# Patient Record
Sex: FEMALE | Race: WHITE | Employment: UNEMPLOYED | URBAN - METROPOLITAN AREA
[De-identification: names, ages, dates, MRNs, and addresses within clinical notes are randomized per-mention and may not be internally consistent; named-entity substitution may affect disease eponyms.]

---

## 2017-08-14 PROBLEM — D48.5 NEOPLASM OF UNCERTAIN BEHAVIOR OF SKIN: Status: ACTIVE | Noted: 2017-08-14

## 2017-08-14 PROBLEM — R52 SCAR PAINFUL: Status: ACTIVE | Noted: 2017-08-14

## 2017-08-14 PROBLEM — L90.5 SCAR PAINFUL: Status: ACTIVE | Noted: 2017-08-14

## 2017-12-18 ENCOUNTER — APPOINTMENT (OUTPATIENT)
Dept: GENERAL RADIOLOGY | Age: 52
End: 2017-12-18
Payer: MEDICARE

## 2017-12-18 ENCOUNTER — HOSPITAL ENCOUNTER (EMERGENCY)
Age: 52
Discharge: HOME | End: 2017-12-18
Attending: EMERGENCY MEDICINE
Payer: MEDICARE

## 2017-12-18 VITALS
DIASTOLIC BLOOD PRESSURE: 83 MMHG | TEMPERATURE: 97.8 F | SYSTOLIC BLOOD PRESSURE: 131 MMHG | HEART RATE: 75 BPM | BODY MASS INDEX: 27.09 KG/M2 | HEIGHT: 72 IN | WEIGHT: 200 LBS | OXYGEN SATURATION: 98 % | RESPIRATION RATE: 18 BRPM

## 2017-12-18 DIAGNOSIS — M79.671 RIGHT FOOT PAIN: Primary | ICD-10-CM

## 2017-12-18 PROCEDURE — 73630 X-RAY EXAM OF FOOT: CPT

## 2017-12-18 PROCEDURE — 99283 EMERGENCY DEPT VISIT LOW MDM: CPT

## 2017-12-18 RX ORDER — NAPROXEN 500 MG/1
500 TABLET ORAL 2 TIMES DAILY
Qty: 20 TABLET | Refills: 0 | Status: SHIPPED | OUTPATIENT
Start: 2017-12-18

## 2017-12-18 ASSESSMENT — PAIN DESCRIPTION - ORIENTATION: ORIENTATION: RIGHT

## 2017-12-18 ASSESSMENT — PAIN SCALES - GENERAL: PAINLEVEL_OUTOF10: 8

## 2017-12-18 ASSESSMENT — PAIN DESCRIPTION - DESCRIPTORS: DESCRIPTORS: ACHING;DISCOMFORT;THROBBING

## 2017-12-18 ASSESSMENT — PAIN DESCRIPTION - FREQUENCY: FREQUENCY: CONTINUOUS

## 2017-12-18 ASSESSMENT — ENCOUNTER SYMPTOMS: COLOR CHANGE: 0

## 2017-12-18 ASSESSMENT — PAIN DESCRIPTION - LOCATION: LOCATION: FOOT

## 2017-12-18 NOTE — ED PROVIDER NOTES
mouth 2 times daily, Disp-20 tablet, R-0Print                 (Please note that portions of this note were completed with a voice recognition program.  Efforts were made to edit the dictations but occasionally words are mis-transcribed.)    FRANCHESKA Richter NP  12/18/17 0967

## 2017-12-20 ENCOUNTER — OFFICE VISIT (OUTPATIENT)
Dept: PODIATRY | Age: 52
End: 2017-12-20
Payer: MEDICARE

## 2017-12-20 VITALS — BODY MASS INDEX: 27.09 KG/M2 | WEIGHT: 200 LBS | HEIGHT: 72 IN

## 2017-12-20 DIAGNOSIS — M79.671 PAIN IN BOTH FEET: ICD-10-CM

## 2017-12-20 DIAGNOSIS — Q66.229 METATARSUS ADDUCTUS: ICD-10-CM

## 2017-12-20 DIAGNOSIS — M79.672 PAIN IN BOTH FEET: ICD-10-CM

## 2017-12-20 DIAGNOSIS — M72.2 PLANTAR FASCIAL FIBROMATOSIS: Primary | ICD-10-CM

## 2017-12-20 PROCEDURE — G8484 FLU IMMUNIZE NO ADMIN: HCPCS | Performed by: PODIATRIST

## 2017-12-20 PROCEDURE — L3020 FOOT LONGITUD/METATARSAL SUP: HCPCS | Performed by: PODIATRIST

## 2017-12-20 PROCEDURE — G8427 DOCREV CUR MEDS BY ELIG CLIN: HCPCS | Performed by: PODIATRIST

## 2017-12-20 PROCEDURE — 99203 OFFICE O/P NEW LOW 30 MIN: CPT | Performed by: PODIATRIST

## 2017-12-20 PROCEDURE — G8419 CALC BMI OUT NRM PARAM NOF/U: HCPCS | Performed by: PODIATRIST

## 2017-12-20 PROCEDURE — 3014F SCREEN MAMMO DOC REV: CPT | Performed by: PODIATRIST

## 2017-12-20 PROCEDURE — 4004F PT TOBACCO SCREEN RCVD TLK: CPT | Performed by: PODIATRIST

## 2017-12-20 PROCEDURE — 29540 STRAPPING ANKLE &/FOOT: CPT | Performed by: PODIATRIST

## 2017-12-20 PROCEDURE — 3017F COLORECTAL CA SCREEN DOC REV: CPT | Performed by: PODIATRIST

## 2017-12-20 NOTE — PROGRESS NOTES
Review of Systems    Lower Extremity Physical Examination:     Vitals: There were no vitals filed for this visit. General: AAO x 3 in NAD. Dermatologic Exam:  Skin lesion/ulceration Absent . Skin No rashes or nodules noted. .       Musculoskeletal:     1st MPJ ROM decreased, Bilateral.  Muscle strength 5/5, Bilateral.  Pain present upon palpation of right and left plantar medial calcaneal tuberosity  Pain increased with Dorsiflexion of the toes neto. .  Medial longitudinal arch, Bilateral WNL. Ankle ROM WNL,Bilateral.    Dorsally contracted digits absent digits 1-5 Bilateral.     Vascular: DP and PT pulses palpable 2/4, Bilateral.  CFT <3 seconds, Bilateral.  Hair growth present to the level of the digits, Bilateral.  Edema absent, Bilateral.  Varicosities absent, Bilateral. Erythema absent, Bilateral    Neurological: Sensation intact to light touch to level of digits, Bilateral.  Protective sensation intact 10/10 sites via 5.07/10g Pollock-Daquan Monofilament, Bilateral.  negative Tinel's, Bilateral.  negative Valleix sign, Bilateral.      Integument: Warm, dry, supple, Bilateral.  Open lesion absent, Bilateral.  Interdigital maceration absent to web spaces 1-4, Bilateral.  Nails are normal in length, thickness and color 1-5 bilateral.  Fissures absent, Bilateral.     Radiographs:  Previously done at [facility] on 12/18/17. Reviewed with the patient  Asessment: Patient is a 47 y.o. female with:   1. Plantar fascial fibromatosis  GA FOOT LONGITUD/METATARSAL SUP    78105 - GA STRAPPING; ANKLE &/OR FOOT   2. Pain in both feet  GA FOOT LONGITUD/METATARSAL SUP    68251 - GA STRAPPING; ANKLE &/OR FOOT   3. Metatarsus adductus  GA FOOT LONGITUD/METATARSAL SUP    36826 - GA STRAPPING; ANKLE &/OR FOOT       Plan: Patient examined and evaluated. Current condition and treatment options discussed in detail. Discussed conservative and surgical options with the patient.  Pt was placed in a low dye taping to both PM  12/20/2017

## 2018-08-08 ENCOUNTER — HOSPITAL ENCOUNTER (EMERGENCY)
Age: 53
Discharge: HOME | End: 2018-08-08
Payer: MEDICARE

## 2018-08-08 ENCOUNTER — APPOINTMENT (OUTPATIENT)
Dept: GENERAL RADIOLOGY | Age: 53
End: 2018-08-08
Payer: MEDICARE

## 2018-08-08 VITALS
RESPIRATION RATE: 18 BRPM | HEART RATE: 66 BPM | OXYGEN SATURATION: 98 % | DIASTOLIC BLOOD PRESSURE: 86 MMHG | SYSTOLIC BLOOD PRESSURE: 141 MMHG | TEMPERATURE: 98.2 F | WEIGHT: 205.3 LBS | BODY MASS INDEX: 27.81 KG/M2 | HEIGHT: 72 IN

## 2018-08-08 DIAGNOSIS — F41.0 PANIC ATTACK: ICD-10-CM

## 2018-08-08 DIAGNOSIS — R03.0 ELEVATED BLOOD PRESSURE READING: ICD-10-CM

## 2018-08-08 DIAGNOSIS — J01.90 ACUTE SINUSITIS, RECURRENCE NOT SPECIFIED, UNSPECIFIED LOCATION: Primary | ICD-10-CM

## 2018-08-08 LAB
ABSOLUTE EOS #: 0.3 K/UL (ref 0–0.4)
ABSOLUTE IMMATURE GRANULOCYTE: NORMAL K/UL (ref 0–0.3)
ABSOLUTE LYMPH #: 2.3 K/UL (ref 1–4.8)
ABSOLUTE MONO #: 0.5 K/UL (ref 0.2–0.8)
ALBUMIN SERPL-MCNC: 4.2 G/DL (ref 3.5–5.2)
ALBUMIN/GLOBULIN RATIO: ABNORMAL (ref 1–2.5)
ALP BLD-CCNC: 57 U/L (ref 35–104)
ALT SERPL-CCNC: 18 U/L (ref 5–33)
ANION GAP SERPL CALCULATED.3IONS-SCNC: 10 MMOL/L (ref 9–17)
AST SERPL-CCNC: 16 U/L
BASOPHILS # BLD: 0 % (ref 0–2)
BASOPHILS ABSOLUTE: 0 K/UL (ref 0–0.2)
BILIRUB SERPL-MCNC: 0.24 MG/DL (ref 0.3–1.2)
BUN BLDV-MCNC: 14 MG/DL (ref 6–20)
BUN/CREAT BLD: 22 (ref 9–20)
CALCIUM SERPL-MCNC: 9 MG/DL (ref 8.6–10.4)
CHLORIDE BLD-SCNC: 103 MMOL/L (ref 98–107)
CO2: 28 MMOL/L (ref 20–31)
CREAT SERPL-MCNC: 0.64 MG/DL (ref 0.5–0.9)
DIFFERENTIAL TYPE: NORMAL
EKG ATRIAL RATE: 56 BPM
EKG P AXIS: 50 DEGREES
EKG P-R INTERVAL: 146 MS
EKG Q-T INTERVAL: 402 MS
EKG QRS DURATION: 88 MS
EKG QTC CALCULATION (BAZETT): 387 MS
EKG R AXIS: 66 DEGREES
EKG T AXIS: 44 DEGREES
EKG VENTRICULAR RATE: 56 BPM
EOSINOPHILS RELATIVE PERCENT: 4 % (ref 1–4)
GFR AFRICAN AMERICAN: >60 ML/MIN
GFR NON-AFRICAN AMERICAN: >60 ML/MIN
GFR SERPL CREATININE-BSD FRML MDRD: ABNORMAL ML/MIN/{1.73_M2}
GFR SERPL CREATININE-BSD FRML MDRD: ABNORMAL ML/MIN/{1.73_M2}
GLUCOSE BLD-MCNC: 98 MG/DL (ref 70–99)
HCT VFR BLD CALC: 39.4 % (ref 36–46)
HEMOGLOBIN: 12.9 G/DL (ref 12–16)
IMMATURE GRANULOCYTES: NORMAL %
LYMPHOCYTES # BLD: 29 % (ref 24–44)
MCH RBC QN AUTO: 29.4 PG (ref 26–34)
MCHC RBC AUTO-ENTMCNC: 32.6 G/DL (ref 31–37)
MCV RBC AUTO: 90.1 FL (ref 80–100)
MONOCYTES # BLD: 7 % (ref 1–7)
MYOGLOBIN: 32 NG/ML (ref 25–58)
NRBC AUTOMATED: NORMAL PER 100 WBC
PDW BLD-RTO: 13.4 % (ref 11.5–14.5)
PLATELET # BLD: 201 K/UL (ref 130–400)
PLATELET ESTIMATE: NORMAL
PMV BLD AUTO: 9.9 FL (ref 6–12)
POTASSIUM SERPL-SCNC: 4.1 MMOL/L (ref 3.7–5.3)
RBC # BLD: 4.38 M/UL (ref 4–5.2)
RBC # BLD: NORMAL 10*6/UL
SEG NEUTROPHILS: 60 % (ref 36–66)
SEGMENTED NEUTROPHILS ABSOLUTE COUNT: 4.8 K/UL (ref 1.8–7.7)
SODIUM BLD-SCNC: 141 MMOL/L (ref 135–144)
TOTAL PROTEIN: 6.6 G/DL (ref 6.4–8.3)
TROPONIN INTERP: NORMAL
TROPONIN T: <0.03 NG/ML
WBC # BLD: 7.9 K/UL (ref 3.5–11)
WBC # BLD: NORMAL 10*3/UL

## 2018-08-08 PROCEDURE — 84484 ASSAY OF TROPONIN QUANT: CPT

## 2018-08-08 PROCEDURE — 85025 COMPLETE CBC W/AUTO DIFF WBC: CPT

## 2018-08-08 PROCEDURE — 83874 ASSAY OF MYOGLOBIN: CPT

## 2018-08-08 PROCEDURE — 99283 EMERGENCY DEPT VISIT LOW MDM: CPT

## 2018-08-08 PROCEDURE — 71046 X-RAY EXAM CHEST 2 VIEWS: CPT

## 2018-08-08 PROCEDURE — 93005 ELECTROCARDIOGRAM TRACING: CPT

## 2018-08-08 PROCEDURE — 80053 COMPREHEN METABOLIC PANEL: CPT

## 2018-08-08 RX ORDER — AMOXICILLIN AND CLAVULANATE POTASSIUM 875; 125 MG/1; MG/1
1 TABLET, FILM COATED ORAL 2 TIMES DAILY
Qty: 20 TABLET | Refills: 0 | Status: SHIPPED | OUTPATIENT
Start: 2018-08-08 | End: 2018-08-18

## 2018-08-08 RX ORDER — FLUCONAZOLE 150 MG/1
150 TABLET ORAL ONCE
Qty: 1 TABLET | Refills: 1 | Status: SHIPPED | OUTPATIENT
Start: 2018-08-08 | End: 2018-08-08

## 2018-08-08 NOTE — ED PROVIDER NOTES
sinusitis, recurrence not specified, unspecified location    2. Panic attack    3.  Elevated blood pressure reading          DISPOSITION/PLAN   DISPOSITION Decision To Discharge 08/08/2018 03:46:07 PM      PATIENT REFERRED TO:   Rafi Walker  [address] [address]  [phone]    In 3 days        DISCHARGE MEDICATIONS:     New Prescriptions    AMOXICILLIN-CLAVULANATE (AUGMENTIN) 875-125 MG PER TABLET    Take 1 tablet by mouth 2 times daily for 10 days           (Please note that portions of this note were completed with a voice recognition program.  Efforts were made to edit the dictations but occasionally words are mis-transcribed.)    JERICA Devries PA-C  08/08/18 9298

## 2018-08-08 NOTE — ED NOTES
Pt presents to ED ambulatory with steady gait c/o of generalized numbness and tingling to body. Pt states she used a sinus spray today because she feels as if her sinuses were clogged. Pt states after using spray, having an all over body numbness/tingling feeling. Pt states also developing a headache which has resolved. Pt denies chest pain or SOB. Pt denies fever or chills. Respirations are even and non-labored. Skin is pink, warm, and dry. Pt denies pain at this time. Pt denies n/v/d/c. Pt denies any urinary symptoms.  Pt does states pressure to bilateral facial cheek area     Severa Current, BRENDEN  08/08/18 1099

## 2019-08-03 ENCOUNTER — HOSPITAL ENCOUNTER (EMERGENCY)
Age: 54
Discharge: HOME | End: 2019-08-03
Attending: EMERGENCY MEDICINE
Payer: MEDICARE

## 2019-08-03 VITALS
OXYGEN SATURATION: 99 % | RESPIRATION RATE: 18 BRPM | HEART RATE: 80 BPM | BODY MASS INDEX: 28.44 KG/M2 | HEIGHT: 72 IN | DIASTOLIC BLOOD PRESSURE: 71 MMHG | TEMPERATURE: 98.6 F | SYSTOLIC BLOOD PRESSURE: 123 MMHG | WEIGHT: 210 LBS

## 2019-08-03 DIAGNOSIS — L03.211 FACIAL CELLULITIS: Primary | ICD-10-CM

## 2019-08-03 PROCEDURE — 99283 EMERGENCY DEPT VISIT LOW MDM: CPT

## 2019-08-03 PROCEDURE — 6370000000 HC RX 637 (ALT 250 FOR IP): Performed by: EMERGENCY MEDICINE

## 2019-08-03 RX ORDER — SULFAMETHOXAZOLE AND TRIMETHOPRIM 800; 160 MG/1; MG/1
1 TABLET ORAL 2 TIMES DAILY
Qty: 20 TABLET | Refills: 0 | Status: SHIPPED | OUTPATIENT
Start: 2019-08-03 | End: 2019-08-13

## 2019-08-03 RX ORDER — SULFAMETHOXAZOLE AND TRIMETHOPRIM 800; 160 MG/1; MG/1
1 TABLET ORAL ONCE
Status: COMPLETED | OUTPATIENT
Start: 2019-08-03 | End: 2019-08-03

## 2019-08-03 RX ADMIN — SULFAMETHOXAZOLE AND TRIMETHOPRIM 1 TABLET: 800; 160 TABLET ORAL at 17:41

## 2019-08-03 ASSESSMENT — PAIN DESCRIPTION - DESCRIPTORS: DESCRIPTORS: ACHING;TENDER;SHARP;SHOOTING

## 2019-08-03 ASSESSMENT — PAIN SCALES - GENERAL: PAINLEVEL_OUTOF10: 10

## 2019-08-03 ASSESSMENT — VISUAL ACUITY
OS: 20/25
OD: 20/40
OU: 20/25

## 2019-08-03 ASSESSMENT — PAIN DESCRIPTION - LOCATION: LOCATION: FACE;EAR

## 2019-08-03 ASSESSMENT — PAIN DESCRIPTION - ORIENTATION: ORIENTATION: RIGHT

## 2019-08-03 ASSESSMENT — PAIN DESCRIPTION - PAIN TYPE: TYPE: ACUTE PAIN

## 2019-08-03 NOTE — ED PROVIDER NOTES
Every Day Smoker     Packs/day: 1.00     Types: Cigarettes    Smokeless tobacco: Never Used   Substance Use Topics    Alcohol use: No    Drug use: No     PHYSICAL EXAM     INITIAL VITALS:   Vitals:    08/03/19 1501   BP: 123/71   Pulse: 80   Resp: 18   Temp: 98.6 °F (37 °C)   TempSrc: Oral   SpO2: 99%   Weight: 210 lb (95.3 kg)   Height: 6' 1\" (1.854 m)       Physical Exam   Constitutional: She is oriented to person, place, and time. She appears well-developed and well-nourished. HENT:   Head: Normocephalic and atraumatic. Eyes: Conjunctivae and EOM are normal.   Neck: Normal range of motion. Neck supple. Cardiovascular: Normal rate and regular rhythm. Pulmonary/Chest: Effort normal and breath sounds normal.   Abdominal: Soft. She exhibits no mass. There is no tenderness. Musculoskeletal: Normal range of motion. She exhibits no edema, tenderness or deformity. Neurological: She is alert and oriented to person, place, and time. Skin: Skin is warm and dry. MEDICAL DECISION MAKING:          CRITICAL CARE:       PROCEDURES:    Procedures    DIAGNOSTIC RESULTS   EKG:All EKG's are interpreted by the Emergency Department Physician who either signs or Co-signs this chart in the absence of a cardiologist.        RADIOLOGY:All plain film, CT, MRI, and formal ultrasound images (except ED bedside ultrasound) are read by the radiologist, see reports below, unless otherwisenoted in MDM or here. No orders to display     LABS: All lab results were reviewed by myself, and all abnormals are listed below.   Labs Reviewed - No data to display    EMERGENCY DEPARTMENTCOURSE:         Vitals:    Vitals:    08/03/19 1501   BP: 123/71   Pulse: 80   Resp: 18   Temp: 98.6 °F (37 °C)   TempSrc: Oral   SpO2: 99%   Weight: 210 lb (95.3 kg)   Height: 6' 1\" (1.854 m)       The patient was given the following medications while in the emergency department:  Orders Placed This Encounter   Medications    sulfamethoxazole-trimethoprim (BACTRIM DS;SEPTRA DS) 800-160 MG per tablet 1 tablet    sulfamethoxazole-trimethoprim (BACTRIM DS) 800-160 MG per tablet     Sig: Take 1 tablet by mouth 2 times daily for 10 days     Dispense:  20 tablet     Refill:  0     CONSULTS:  None    FINAL IMPRESSION      1.  Facial cellulitis          DISPOSITION/PLAN   DISPOSITION Decision To Discharge 08/03/2019 04:07:35 PM      PATIENT REFERRED TO:  KEON Anderson - [address]  [phone]    Schedule an appointment as soon as possible for a visit in 1 week      DISCHARGE MEDICATIONS:  Discharge Medication List as of 8/3/2019  4:24 PM      START taking these medications    Details   sulfamethoxazole-trimethoprim (BACTRIM DS) 800-160 MG per tablet Take 1 tablet by mouth 2 times daily for 10 days, Disp-20 tablet, R-0Print           Aldo Rivera MD  Attending Emergency Physician                    Tori Bradford MD  08/03/19 4182

## 2019-11-30 ENCOUNTER — HOSPITAL ENCOUNTER (OUTPATIENT)
Dept: MRI IMAGING | Age: 54
Discharge: HOME | End: 2019-12-02
Payer: MEDICARE

## 2019-11-30 DIAGNOSIS — M50.30 DDD (DEGENERATIVE DISC DISEASE), CERVICAL: ICD-10-CM

## 2019-11-30 PROCEDURE — 72141 MRI NECK SPINE W/O DYE: CPT

## 2020-02-05 ENCOUNTER — HOSPITAL ENCOUNTER (OUTPATIENT)
Dept: PHYSICAL THERAPY | Age: 55
Setting detail: THERAPIES SERIES
Discharge: HOME | End: 2020-02-05
Payer: MEDICARE

## 2020-02-05 PROCEDURE — 97161 PT EVAL LOW COMPLEX 20 MIN: CPT

## 2020-02-05 PROCEDURE — 97140 MANUAL THERAPY 1/> REGIONS: CPT

## 2020-02-05 PROCEDURE — 97110 THERAPEUTIC EXERCISES: CPT

## 2020-02-05 ASSESSMENT — PAIN DESCRIPTION - PAIN TYPE: TYPE: SURGICAL PAIN

## 2020-02-05 ASSESSMENT — PAIN SCALES - GENERAL: PAINLEVEL_OUTOF10: 7

## 2020-02-05 ASSESSMENT — PAIN DESCRIPTION - PROGRESSION: CLINICAL_PROGRESSION: GRADUALLY IMPROVING

## 2020-02-05 ASSESSMENT — 9 HOLE PEG TEST
TESTTIME_SECONDS: 21
TESTTIME_SECONDS: 42

## 2020-02-05 ASSESSMENT — PAIN DESCRIPTION - LOCATION: LOCATION: HAND;ELBOW

## 2020-02-05 ASSESSMENT — PAIN DESCRIPTION - ORIENTATION: ORIENTATION: RIGHT

## 2020-02-05 ASSESSMENT — PAIN DESCRIPTION - ONSET: ONSET: ON-GOING

## 2020-02-05 NOTE — PROGRESS NOTES
Physical Therapy  Initial Assessment  Date: 2020  Patient Name: Yamile Agrawal  MRN: [id number]  : 1965     Treatment Diagnosis: Pain M25.521, M25.531 , M79.641 Edema M25.431  Stiffness M25.631, M25.641  Decreased coordination R27.8    Numbness R20.2   Weakness M62.541, M62.531  Subjective   General  Chart Reviewed: Yes  Patient assessed for rehabilitation services?: Yes  Referring Practitioner: Dr. Olga Gomez  Referral Date : 20  Diagnosis: R Harris's arthroplasty, R cubital  tunnel release, R carpal tunnel release G56.01  Follows Commands: Within Functional Limits  Other (Comment): Abrahan appt. 2020  General Comment  Comments: No specific injury, pain/ numbness just got worse. Had R CMC arthroplasty and was placed in a cast for about 14 days which got tight and hand was swollen. PT Visit Information  Onset Date: 01/10/20  PT Insurance Information: [employer] 30/ year  Total # of Visits Approved: 12  Total # of Visits to Date: 1  Subjective  Subjective: Complains of ache on the whole R arm, wakes her up at night especially when it gets cold. Unable to button up shirt, open doors, can't twist anything  Pain Screening  Patient Currently in Pain: Yes  Pain Assessment  Pain Assessment: 0-10  Pain Level: 7(can go up to 9/10 on R thumb)  Pain Type: Surgical pain  Pain Location: Hand;Elbow(thumb)  Pain Orientation: Right  Pain Descriptors: Constant; Aching;Dull; Sharp;Numbness(numb on radial thumb)  Pain Onset: On-going  Clinical Progression: Gradually improving  Non-Pharmaceutical Pain Intervention(s): Heat applied; Other (Comment)(warm soaks)  Response to Pain Intervention: Patient Satisfied(ice makes it worse)  Vital Signs  Patient Currently in Pain: Yes    Vision/Hearing  Vision  Vision: (wears glasses)  Hearing  Hearing: Within functional limits  Social/Functional History  Social/Functional History  Lives With: Family  Type of Home: House  Home Layout: One level  ADL Assistance: Independent(able Strength - Pinch (lbs)  Lateral: 17#  Tip: 12#  Palmar 3 point: 14#  Right Hand Strength -  (lbs)  Handle Setting 2: 0#  Right Hand Strength - Pinch (lbs)  Lateral: 2# with pain *  Tip: 2# *  Palmar 3 point: 2#  RUE Edema - Circumference (cm)  RUE Edema Present?: Yes  Elbow Crease: R: 27.3 cm    L 27 cm  Wrist Crease: R: 18.2 cm    L 16.8 cm  Distal Palmar Crease: R: 19.5 cm    L 19.5 cm  R Thumb IP: R:( P1 )  7 cm   L 6.5 cm  Fine Motor Skills  Left 9 Hole Peg Test Time (secs): 21  Right 9 Hole Peg Test Time (secs): 42  Fine Motor Comment: Slow and careful movement on R hand when picking up pegs, states can feel the pegs with fingers but not as much on the thumb  Assessment   Conditions Requiring Skilled Therapeutic Intervention  Body structures, Functions, Activity limitations: Decreased functional mobility ; Decreased ADL status; Decreased ROM; Decreased sensation;Decreased coordination;Decreased fine motor control;Decreased strength; Increased pain  Assessment: Will benefit from scar management, edema control, manual therapy and therapeutic exercises to improve function of RUE. Use pain modalities as needed. Noted decreased ROM, strength, coordination, sensation of R thumb/ wrist/ hand. Treatment Diagnosis: Pain M25.521, M25.531 , M79.641 Edema M25.431  Stiffness M25.631, M25.641  Decreased coordination R27.8    Numbness R20.2   Weakness M62.541, M62.531  Prognosis: Good  Decision Making: Low Complexity  History: No significant comorbidity  Exam: Pain, palpation, ROM, MMT,  and prehension dynamometry, coordination, sensation, UEFS  Clinical Presentation: Stable  Barriers to Learning: None  Treatment Initiated : Scar/ retrograde massage, AA/PROM, instruction in HEP - given written instructions with demo of ex. Copy on file.  Good understanding  Activity Tolerance  Activity Tolerance: Patient limited by pain   Plan   Plan  Times per week: Script: 2-3x/wk for 6 weeks, prefers 2x/wk for 12 visits  Specific instructions for Next Treatment: Initiate manual therapy, thera. ex including fine motor activities, submax. isometrics  Current Treatment Recommendations: Strengthening, Manual Therapy - Joint Manipulation, Patient/Caregiver Education & Training, ROM, Modalities, Home Exercise Program, Manual Therapy - Soft Tissue Mobilization, Positioning, Safety Education & Training  Plan Comment: Initiate treatment plan  OutComes Score  UEFS Score: 13.75 (02/05/20 0750)       02/05/20 0750   The Upper Extremity Functional Scale   Any of your usual work, housework, or school activities 2   Your usual hobbies, recreational, or sporting activities 0   Lifting a bag of groceries to waist level 0   Lifting a bag of groceries above your head 0   Grooming your hair 0   Pushing up on your hands (eg from bathtub/chair) 0   Preparing food (eg peeling, cutting) 0   Driving 2   Vacuuming, sweeping, or raking 1   Dressing 2   Doing up buttons 1   Using tools or appliances 0   Opening doors 0   Cleaning 0   Tying or lacing shoes 0   Sleeping 1   Laundering clothes (eg washing, ironing, folding)  2   Opening a jar 0   Throwing a ball 0   Carrying a small suitcase with your affected limb 0   UEFS Score 13.75   UEFS Disability Index 80-99%   UEFS CMS Modifier CM     Upper Extremity Functional Scale -   Instruction to patient  - We are interested in knowing whether you are having any difficulty at all with the activities listed below because of your upper limb problem for which you are currently seeking attention.     Key:  0 = Extreme Difficulty or Unable to Perform Activity   1 = Quite a Bit of Difficulty   2 = Moderate difficulty   3 = A Little Bit of Difficulty   4 = No Difficulty   Goals  Short term goals  Time Frame for Short term goals: 6 visits  Short term goal 1: Decrease pain to 3-4/10 to increase tolerance to daily use of RUE and improve sleep  Short term goal 2: Decrease edema on R wrist/ thumb to decrease feeling of tightness and improve mobility  Short term goal 3: Increase ROM of restricted joints by 10 degrees or better to be able to grasp/ handle objects better  Short term goal 4: Improve coordination by 10 sec to be able to tie shoelaces, do buttons  Short term goal 5:  Independent with HEP, joint/ nerve protection  Long term goals  Time Frame for Long term goals : 12 visits  Long term goal 1: Improve strength of R elbow/ wrist/ hand to 4-4+/5 for safe lifting/ carrying  Long term goal 2: Improve  by 5-10# to be able to grasp, open jars/ bottles  Long term goal 3: Improve prehension by 2# each to be able to open packages, tie shoelaces  Long term goal 4: Improve score in UEFS by 20 points from 13.75 = 82% disability  Patient Goals   Patient goals : \" Be able to use R arm/ hand as normal as possible, without pain \"   Therapy Time   Individual Concurrent Group Co-treatment   Time In 0750         Time Out 0910         Minutes 80         Timed Code Treatment Minutes: 25 Minutes     Treatment Charges: Minutes Units   []  Ultrasound     []  Electrical-Stim     []  Iontophoresis     []  Traction     []  Massage       [x]  Eval 50 1   []  Gait     [x]  Ther Exercise 15   1   [x]  Manual Therapy 10   1   []  Ther Activities       []  Aquatics     []  Vasopneumatic Device     []  Neuro Re-Ed       []  Other       Total Treatment Time: 76 3        Ma  Owen Figueroa PT Electronically signed by Raphael Guerrero PT,CHT on 2/5/2020 at 1:38 PM

## 2020-02-11 ENCOUNTER — HOSPITAL ENCOUNTER (OUTPATIENT)
Dept: PHYSICAL THERAPY | Age: 55
Setting detail: THERAPIES SERIES
Discharge: HOME | End: 2020-02-11
Payer: MEDICARE

## 2020-02-11 PROCEDURE — 97110 THERAPEUTIC EXERCISES: CPT

## 2020-02-11 PROCEDURE — 97140 MANUAL THERAPY 1/> REGIONS: CPT

## 2020-02-11 ASSESSMENT — PAIN DESCRIPTION - PAIN TYPE: TYPE: SURGICAL PAIN

## 2020-02-11 ASSESSMENT — PAIN DESCRIPTION - PROGRESSION: CLINICAL_PROGRESSION: GRADUALLY IMPROVING

## 2020-02-11 ASSESSMENT — PAIN DESCRIPTION - ORIENTATION: ORIENTATION: RIGHT

## 2020-02-11 ASSESSMENT — PAIN SCALES - GENERAL: PAINLEVEL_OUTOF10: 3

## 2020-02-11 ASSESSMENT — PAIN DESCRIPTION - LOCATION: LOCATION: ARM;HAND;ELBOW

## 2020-02-11 NOTE — PROGRESS NOTES
[address]   Outpatient Physical Therapy  [address] [facility]  Phone: [phone]  Fax: [phone]    Daily Progress Note    Date: 20    Patient Name: Delicia Johnson        MRN: [id number]  Account: [de-identified] : 1965      General Information:  Referring Practitioner: Dr. Yahaira Amezcua  Referral Date : 20  Diagnosis: R Harris's arthroplasty, R cubital  tunnel release, R carpal tunnel release G56.01  Follows Commands: Within Functional Limits  Other (Comment): 's appt. 2020  Onset Date: 01/10/20  PT Insurance Information: [place] Advantage 30/ year  Total # of Visits Approved: 12  Total # of Visits to Date: 2  No Show: 0  Canceled Appointment: 0    Subjective:  Subjective: R thumb and wrist not as bad,  on medial elbow when bumped, main complaint of pain/ ache on R shoulder/arm at night especially when it's cold. Pain:  Patient Currently in Pain: Yes  Pain Assessment: 0-10  Pain Level: 3(can go up to 7/10 at night)  Pain Type: Surgical pain  Pain Location: Arm;Hand;Elbow  Pain Orientation: Right  Clinical Progression: Gradually improving       Objective:  Exercise 1: Soft tissue mob/ scar massage, retrograde massage of thumb/ wrist/ elbow  Exercise 2: AA/AROM R elbow, wrist/ hand/ thumb  Exercise 3: Key pegs for fine dexterity ex. Exercise 4: Velcro checkers for light resistive prehension  Exercise 5: Cone passing for static grasping plus elbow flexion/ extension, 10x  Exercise 6: E-[employer] exerboard, #2, 10x, gentle resistive finger flexion/ extension  Exercise 7: Towel crunches 10x  Exercise 8: Thumb stabilization exercises/ submax. isometrics      Assessment: Body structures, Functions, Activity limitations: Decreased functional mobility ; Decreased ADL status; Decreased ROM; Decreased sensation;Decreased coordination;Decreased fine motor control;Decreased strength; Increased pain  Assessment: Initiated exercise program with good tolerance.  Reminded of thumb position during prehesile activities to protect joint. Treatment Diagnosis: Pain M25.521, M25.531 , M79.641 Edema M25.431  Stiffness M25.631, M25.641  Decreased coordination R27.8    Numbness R20.2   Weakness M62.541, M62.531  Prognosis: Good  REQUIRES PT FOLLOW UP: Yes      Goals:  Patient Goals:  Patient goals : \" Be able to use R arm/ hand as normal as possible, without pain \"  Short Term Goals:  Time Frame for Short term goals: 6 visits  Short term goal 1: Decrease pain to 3-4/10 to increase tolerance to daily use of RUE and improve sleep - ONGOING  Short term goal 2: Decrease edema on R wrist/ thumb to decrease feeling of tightness and improve mobility - ONGOING  Short term goal 3: Increase ROM of restricted joints by 10 degrees or better to be able to grasp/ handle objects better - ONGOING  Short term goal 4: Improve coordination by 10 sec to be able to tie shoelaces, do buttons -ONGOING  Short term goal 5: Independent with HEP, joint/ nerve protection - MET  Long Term Goals:  Time Frame for Long term goals : 12 visits  Long term goal 1: Improve strength of R elbow/ wrist/ hand to 4-4+/5 for safe lifting/ carrying  Long term goal 2: Improve  by 5-10# to be able to grasp, open jars/ bottles  Long term goal 3: Improve prehension by 2# each to be able to open packages, tie shoelaces  Long term goal 4: Improve score in UEFS by 20 points from 13.75 = 82% disability    Plan:     Times per week: Script: 2-3x/wk for 6 weeks, prefers 2x/wk for 12 visits  Current Treatment Recommendations: Strengthening;Manual Therapy - Joint Manipulation;Patient/Caregiver Education & Training;ROM;Modalities; Home Exercise Program;Manual Therapy - Soft Tissue Mobilization;Positioning  Plan Comment: Progress as tolerated    Therapy Time:  Time In: 0740  Time Out: 0830  Minutes: 50  Timed Code Treatment Minutes: 40 Minutes    Treatment Charges: Minutes Units   []  Ultrasound     []  Electrical-Stim     []  Iontophoresis     [] Traction     []  Massage       []  Eval     []  Gait     [x]  Ther Exercise 25  2    [x]  Manual Therapy 15  2    []  Ther Activities       []  Aquatics     []  Neuro Re-Ed       []  Other       Total Treatment Time: 36 3        Ma  Helena Adame PT Electronically signed by Van Marquez PT,CHT on 2/11/2020 at 3:31 PM

## 2020-02-14 ENCOUNTER — HOSPITAL ENCOUNTER (OUTPATIENT)
Dept: PHYSICAL THERAPY | Age: 55
Setting detail: THERAPIES SERIES
Discharge: HOME | End: 2020-02-14
Payer: MEDICARE

## 2020-02-14 PROCEDURE — 97110 THERAPEUTIC EXERCISES: CPT

## 2020-02-14 PROCEDURE — 97140 MANUAL THERAPY 1/> REGIONS: CPT

## 2020-02-14 ASSESSMENT — PAIN DESCRIPTION - PROGRESSION: CLINICAL_PROGRESSION: GRADUALLY IMPROVING

## 2020-02-14 ASSESSMENT — PAIN DESCRIPTION - PAIN TYPE: TYPE: SURGICAL PAIN

## 2020-02-14 ASSESSMENT — PAIN SCALES - GENERAL: PAINLEVEL_OUTOF10: 4

## 2020-02-14 ASSESSMENT — PAIN DESCRIPTION - ORIENTATION: ORIENTATION: RIGHT

## 2020-02-14 ASSESSMENT — PAIN DESCRIPTION - LOCATION: LOCATION: ARM;HAND;ELBOW

## 2020-02-14 NOTE — PROGRESS NOTES
Physical Therapy  [address] [facility]   Outpatient Physical Therapy  [address] [facility]  Phone: [phone]  Fax: [phone]  Daily Progress Note    Date: 20    Patient Name: Analia Dunbar        MRN: [id number]  Account: [de-identified] : 1965      General Information:  Referring Practitioner: Dr. Susanna Carlin  Referral Date : 20  Diagnosis: R Harris's arthroplasty, R cubital  tunnel release, R carpal tunnel release G56.01  Follows Commands: Within Functional Limits  Other (Comment): Abrahan appt. 2020  Onset Date: 01/10/20  PT Insurance Information: [place] Advantage 30/ year  Total # of Visits Approved: 12  Total # of Visits to Date: 3  No Show: 0  Canceled Appointment: 0    Subjective:  Subjective: NO NEW COMPLAINTS     Pain:  Patient Currently in Pain: Yes  Pain Level: 4  Pain Type: Surgical pain  Pain Location: Arm;Hand;Elbow  Pain Orientation: Right  Clinical Progression: Gradually improving       Objective:  Exercise 1: Soft tissue mob/ scar massage, retrograde massage of thumb/ wrist/ elbow  Exercise 2: AA/AROM R elbow, wrist/ hand/ thumb  Exercise 3: Key pegs for fine dexterity ex. Exercise 4: Velcro checkers for light resistive prehension  Exercise 5: Cone passing for static grasping plus elbow flexion/ extension, 10x  Exercise 6: E-Z exerboard, #2, 10x, gentle resistive finger flexion/ extension  Exercise 7: Towel crunches 10x  Exercise 8: Thumb stabilization exercises/ submax. isometrics               Assessment: Body structures, Functions, Activity limitations: Decreased functional mobility ; Decreased ADL status; Decreased ROM; Decreased sensation;Decreased coordination;Decreased fine motor control;Decreased strength; Increased pain  Assessment: Initiated exercise program with good tolerance. Reminded of thumb position during prehesile activities to protect joint.   Treatment Diagnosis: Pain M25.521, M25.531 , M79.641 Edema M25.431  Stiffness M25.631, M25.641  Decreased coordination R27.8    Numbness R20.2   Weakness M62.541, M62.531  Prognosis: Good  REQUIRES PT FOLLOW UP: Yes  Activity Tolerance: Patient Tolerated treatment well    Plan:  Plan: Continue with current plan    Therapy Time:  Time In: 1005  Time Out: 1045  Minutes: 40       Treatment Charges: Minutes Units   []  Ultrasound     []  Electrical-Stim     []  Iontophoresis     []  Traction     []  Massage       []  Eval     []  Gait     []  Vasopneumatic Device     [x]  Ther Exercise 25   2   [x]  Manual Therapy 15   1   []  Ther Activities       []  Aquatics     []  Neuro Re-Ed       []  Other       Total Treatment Time: 36 3        Clay Schmitt, PT

## 2020-02-18 ENCOUNTER — HOSPITAL ENCOUNTER (OUTPATIENT)
Dept: PHYSICAL THERAPY | Age: 55
Setting detail: THERAPIES SERIES
Discharge: HOME | End: 2020-02-18
Payer: MEDICARE

## 2020-02-18 NOTE — PROGRESS NOTES
Physical Therapy  Daily Treatment Note  Date: 2020  Patient Name: Angelique Law  MRN: [id number]     :   1965    Subjective:   General  Chart Reviewed: Yes  Referring Practitioner: Dr. Gloria Rosales  PT Visit Information  Onset Date: 01/10/20  PT Insurance Information: [place] Advantage 30/   Total # of Visits Approved: 12  Total # of Visits to Date: 3  No Show: 0  Canceled Appointment: 0  General Comment  Comments: Pateint cancelled today due to conflicting appt.          Magno Mcpherson, PTA

## 2020-02-25 ENCOUNTER — HOSPITAL ENCOUNTER (OUTPATIENT)
Dept: PHYSICAL THERAPY | Age: 55
Setting detail: THERAPIES SERIES
Discharge: HOME | End: 2020-02-25
Payer: MEDICARE

## 2020-02-25 PROCEDURE — 97140 MANUAL THERAPY 1/> REGIONS: CPT

## 2020-02-25 PROCEDURE — 97110 THERAPEUTIC EXERCISES: CPT

## 2020-02-25 ASSESSMENT — PAIN DESCRIPTION - ORIENTATION: ORIENTATION: RIGHT

## 2020-02-25 ASSESSMENT — PAIN DESCRIPTION - PROGRESSION: CLINICAL_PROGRESSION: NOT CHANGED

## 2020-02-25 ASSESSMENT — PAIN SCALES - GENERAL: PAINLEVEL_OUTOF10: 5

## 2020-02-25 ASSESSMENT — PAIN DESCRIPTION - PAIN TYPE: TYPE: SURGICAL PAIN

## 2020-02-25 NOTE — PROGRESS NOTES
[address]   Outpatient Physical Therapy  [address] [facility]. [address]  Phone: [phone]  Fax: [phone]    Daily Progress Note    Date: 20    Patient Name: Jose Murray        MRN: [id number]  Account: [de-identified] : 1965      General Information:  Referring Practitioner: Dr. Timmy Miller  Referral Date : 20  Diagnosis: R Harris's arthroplasty, R cubital  tunnel release, R carpal tunnel release G56.01  Follows Commands: Within Functional Limits  Other (Comment): 's appt. 2020  Onset Date: 01/10/20  PT Insurance Information: [place] [employer] 30/ year  Total # of Visits Approved: 12  Total # of Visits to Date: 4  No Show: 0  Canceled Appointment: 2    Subjective:  Subjective: Complains of increased soreness on radial wrist/ hand, unable to start car and turn keys in doors     Pain:  Patient Currently in Pain: Yes  Pain Assessment: 0-10  Pain Level: 5  Pain Type: Surgical pain  Pain Location: Arm;Hand;Wrist;Elbow  Pain Orientation: Right  Clinical Progression: Not changed       Objective:  Exercise 1: Soft tissue mob/ scar massage, retrograde massage of thumb/ wrist/ elbow  Exercise 2: AA/AROM R elbow, wrist/ hand/ thumb  Exercise 3: Key pegs for fine dexterity ex. Exercise 4: Velcro checkers for light resistive prehension  Exercise 5: Cone passing for static grasping plus elbow flexion/ extension, 10x  Exercise 6: E-Z exerboard, #2, 10x, gentle resistive finger flexion/ extension  Exercise 7: Towel crunches 10x - HEP  Exercise 8: Thumb stabilization exercises/ submax. isometrics  Exercise 9: Juxacizer, 6x  Exercise 10: Putty with cone, static grasping, 3 rows  Exercise 13: Ice 10', double layer of cover       Assessment: Body structures, Functions, Activity limitations: Decreased functional mobility ; Decreased ADL status; Decreased ROM; Decreased sensation;Decreased coordination;Decreased fine motor control;Decreased strength; Increased pain  Assessment: Able to progress with exercises with complaint of increased soreness, applied ice for relief. Treatment Diagnosis: Pain M25.521, M25.531 , M79.641 Edema M25.431  Stiffness M25.631, M25.641  Decreased coordination R27.8    Numbness R20.2   Weakness M62.541, M62.531  Prognosis: Good  REQUIRES PT FOLLOW UP: Yes   Goals:  Patient Goals:  Patient goals : \" Be able to use R arm/ hand as normal as possible, without pain \"  Short Term Goals:  Time Frame for Short term goals: 6 visits  Short term goal 1: Decrease pain to 3-4/10 to increase tolerance to daily use of RUE and improve sleep - ONGOING  Short term goal 2: Decrease edema on R wrist/ thumb to decrease feeling of tightness and improve mobility - ONGOING  Short term goal 3: Increase ROM of restricted joints by 10 degrees or better to be able to grasp/ handle objects better - ONGOING  Short term goal 4: Improve coordination by 10 sec to be able to tie shoelaces, do buttons -ONGOING  Short term goal 5: Independent with HEP, joint/ nerve protection - MET  Long Term Goals:  Time Frame for Long term goals : 12 visits  Long term goal 1: Improve strength of R elbow/ wrist/ hand to 4-4+/5 for safe lifting/ carrying  Long term goal 2: Improve  by 5-10# to be able to grasp, open jars/ bottles  Long term goal 3: Improve prehension by 2# each to be able to open packages, tie shoelaces  Long term goal 4: Improve score in UEFS by 20 points from 13.75 = 82% disability    Plan:     Times per week: Script: 2-3x/wk for 6 weeks, prefers 2x/wk for 12 visits  Current Treatment Recommendations: Strengthening;Manual Therapy - Joint Manipulation;Patient/Caregiver Education & Training;ROM;Modalities; Home Exercise Program;Manual Therapy - Soft Tissue Mobilization  Plan Comment: Progress as tolerated    Therapy Time:  Time In: 1005  Time Out: 1100  Minutes: 55  Timed Code Treatment Minutes: 45 Minutes    Treatment Charges: Minutes Units   []  Ultrasound     []  Electrical-Stim     []  Iontophoresis     [] Traction     []  Massage       []  Eval     []  Gait     [x]  Ther Exercise 30   2   [x]  Manual Therapy 15  1    []  Ther Activities       []  Aquatics     []  Neuro Re-Ed       [x]  Other/ ice 10  0    Total Treatment Time: 54 3        Ma  Naty Ivory PT Electronically signed by Nga Myers PT,CHT on 2/25/2020 at 4:01 PM

## 2020-02-26 ENCOUNTER — HOSPITAL ENCOUNTER (OUTPATIENT)
Dept: PHYSICAL THERAPY | Age: 55
Setting detail: THERAPIES SERIES
Discharge: HOME | End: 2020-02-26
Payer: MEDICARE

## 2020-02-26 PROCEDURE — 97140 MANUAL THERAPY 1/> REGIONS: CPT

## 2020-02-26 PROCEDURE — 97110 THERAPEUTIC EXERCISES: CPT

## 2020-02-26 ASSESSMENT — PAIN SCALES - GENERAL: PAINLEVEL_OUTOF10: 3

## 2020-02-26 ASSESSMENT — PAIN DESCRIPTION - PROGRESSION: CLINICAL_PROGRESSION: GRADUALLY IMPROVING

## 2020-02-26 ASSESSMENT — 9 HOLE PEG TEST
TESTTIME_SECONDS: 21
TESTTIME_SECONDS: 21

## 2020-02-26 ASSESSMENT — PAIN DESCRIPTION - PAIN TYPE: TYPE: SURGICAL PAIN

## 2020-02-26 ASSESSMENT — PAIN DESCRIPTION - ORIENTATION: ORIENTATION: RIGHT

## 2020-02-26 ASSESSMENT — PAIN DESCRIPTION - LOCATION: LOCATION: HAND

## 2020-02-26 NOTE — PROGRESS NOTES
Physical Therapy  Progress Note  Date: 2020  Patient Name: Gregoria Miner  MRN: [id number]     :   1965    Subjective:   General  Referring Practitioner: Dr. Tushar Fuchs  PT Visit Information  Onset Date: 01/10/20  PT Insurance Information: [employer] 30/ year  Total # of Visits Approved: 12  Total # of Visits to Date: 5  No Show: 0  Canceled Appointment: 2  Subjective  Subjective: Complains of ache on R hand, pulling on knuckles with movement , feels some tightness. Still unable to start car ignition, unable to turn door knobs. Pain Screening  Patient Currently in Pain: Yes  Pain Assessment  Pain Assessment: 0-10  Pain Level: 3  Pain Type: Surgical pain  Pain Location: Hand  Pain Orientation: Right  Clinical Progression: Gradually improving  Vital Signs  Patient Currently in Pain: Yes       Treatment Activities:    AROM RUE (degrees)  R Wrist Flexion 0-80: 0/ 50 ( NOW 65 )  R Wrist Extension 0-70: 0 / 55 ( NOW 60 )  R Wrist Radial Deviation 0-20: 0 / 5 ( NOW 20 )  R Wrist Ulnar Deviation 0-45: 0 / 25 - same  Right Hand AROM (degrees)  Right Hand General AROM: Composite flexion - distance from fingertip to palm : Index 1.5 cm ( NOW 0cm )  Middle: 1 cm  ( NOW 0 cm ) Ring and little finger: 0 cm  R Thumb MCP 0-50: 0 / 35 ( NOW 45 )  R Thumb IP 0-80: 0 / 35 ( NOW 45 )  R Thumb Radial ADduction/ABduction 0-55: 0 / 40 ( NOW 50 )  R Thumb Palmar ADduction/ABduction 0-45: 0 / 45 ( NOW 55 )  R Thumb Opposition: 2.5 cm distance to little finger, 2 cm to ring finger ( NOW WNL, slow movement ). WNL to index and middle fingers   Exercises  Exercise 1: Soft tissue mob/ scar massage, retrograde massage of thumb/ wrist/ elbow  Exercise 2: AA/AROM R elbow, wrist/ hand/ thumb  Exercise 3: Key pegs for fine dexterity ex.   Exercise 4: Velcro checkers for light resistive prehension  Exercise 5: Cone passing for static grasping plus elbow flexion/ extension, 10x  Exercise 6: E-Z exerboard, #2, 10x, gentle resistive finger flexion/ extension  Exercise 8: Thumb stabilization exercises/ submax. isometrics  Exercise 9: Juxacizer, 6x  Exercise 10: Putty with cone, static grasping, 3 rows   Hand Dominance  Hand Dominance: Left  Left Hand Strength -  (lbs)  Handle Setting 2: 60#  Left Hand Strength - Pinch (lbs)  Lateral: 17#  Tip: 12#  Palmar 3 point: 14#  Right Hand Strength -  (lbs)  Handle Setting 2: 0#  ( NOW 8#, with pain * )  Right Hand Strength - Pinch (lbs)  Lateral: 2# with pain * ( NOW 3# )  Tip: 2# * ( NOW 2 * )  Palmar 3 point: 2# ( NOW 2* )  RUE Edema - Circumference (cm)  Wrist Crease: R: 18.2 cm ( NOW 18 cm )   L 16.8 cm  R Thumb IP: R:( P1 )  7 cm   L 6.5 cm  Fine Motor Skills  Left 9 Hole Peg Test Time (secs): 21  Right 9 Hole Peg Test Time (secs): 21(was 42 sec initially)   Assessment:   Conditions Requiring Skilled Therapeutic Intervention  Body structures, Functions, Activity limitations: Decreased functional mobility ; Decreased ADL status; Decreased ROM; Decreased sensation;Decreased coordination;Decreased fine motor control;Decreased strength; Increased pain  Assessment: Showing significant progress in mobility but still with pain. Continues with decreased strength of R thumb, weak  and prehension. Improving in functional use of R hand but still careful with certain movements.  Meeting 1/5 STG, 0/4 LTG but making gains towards unmet goal.  Treatment Diagnosis: Pain M25.521, M25.531 , M79.641 Edema M25.431  Stiffness M25.631, M25.641  Decreased coordination R27.8    Numbness R20.2   Weakness M62.541, M62.531  Prognosis: Good  REQUIRES PT FOLLOW UP: Yes    OutComes Score  UEFS Score: 25 (02/26/20 0730)       02/26/20 0730   The Upper Extremity Functional Scale   Any of your usual work, housework, or school activities 2   Your usual hobbies, recreational, or sporting activities 0   Lifting a bag of groceries to waist level 0   Lifting a bag of groceries above your head 0   Grooming your hair 2   Pushing up on your hands (eg from bathtub/chair) 0   Preparing food (eg peeling, cutting) 1   Driving 2   Vacuuming, sweeping, or raking 2   Dressing 3   Doing up buttons 1   Using tools or appliances 0   Opening doors 1   Cleaning 1   Tying or lacing shoes 2   Sleeping 1   Laundering clothes (eg washing, ironing, folding)  2   Opening a jar 0   Throwing a ball 0   Carrying a small suitcase with your affected limb 0   UEFS Score 25   UEFS Disability Index 60-79%   UEFS CMS Modifier CL     Upper Extremity Functional Scale -   Instruction to patient  - We are interested in knowing whether you are having any difficulty at all with the activities listed below because of your upper limb problem for which you are currently seeking attention. Key:  0 = Extreme Difficulty or Unable to Perform Activity   1 = Quite a Bit of Difficulty   2 = Moderate difficulty   3 = A Little Bit of Difficulty   4 = No Difficulty   Goals:  Short term goals  Time Frame for Short term goals: 6 visits  Short term goal 1: Decrease pain to 3-4/10 to increase tolerance to daily use of RUE and improve sleep - ONGOING  Short term goal 2: Decrease edema on R wrist/ thumb to decrease feeling of tightness and improve mobility - ONGOING  Short term goal 3: Increase ROM of restricted joints by 10 degrees or better to be able to grasp/ handle objects better - PARTLY MET  Short term goal 4: Improve coordination by 10 sec to be able to tie shoelaces, do buttons - PARTLY MET ( improved time with 9 hole peg test but still difficult to button shirt and tie shoelaces )  Short term goal 5:  Independent with HEP, joint/ nerve protection - MET  Long term goals  Time Frame for Long term goals : 12 visits  Long term goal 1: Improve strength of R elbow/ wrist/ hand to 4-4+/5 for safe lifting/ carrying - ONGOING  Long term goal 2: Improve  by 5-10# to be able to grasp, open jars/ bottles - PARTLY MET  Long term goal 3: Improve prehension by 2# each to be able to open packages,

## 2020-02-28 ENCOUNTER — APPOINTMENT (OUTPATIENT)
Dept: PHYSICAL THERAPY | Age: 55
End: 2020-02-28
Payer: MEDICARE

## 2020-03-05 ENCOUNTER — HOSPITAL ENCOUNTER (OUTPATIENT)
Dept: PHYSICAL THERAPY | Age: 55
Setting detail: THERAPIES SERIES
Discharge: HOME | End: 2020-03-05
Payer: MEDICARE

## 2020-03-05 PROCEDURE — 97110 THERAPEUTIC EXERCISES: CPT

## 2020-03-05 PROCEDURE — 97140 MANUAL THERAPY 1/> REGIONS: CPT

## 2020-03-05 ASSESSMENT — PAIN SCALES - GENERAL: PAINLEVEL_OUTOF10: 3

## 2020-03-05 ASSESSMENT — PAIN DESCRIPTION - PAIN TYPE: TYPE: SURGICAL PAIN

## 2020-03-05 ASSESSMENT — PAIN DESCRIPTION - ORIENTATION: ORIENTATION: RIGHT

## 2020-03-05 ASSESSMENT — PAIN DESCRIPTION - PROGRESSION: CLINICAL_PROGRESSION: NOT CHANGED

## 2020-03-05 NOTE — PROGRESS NOTES
1035  Minutes: 40  Timed Code Treatment Minutes: 40 Minutes    Treatment Charges: Minutes Units   []  Ultrasound     []  Electrical-Stim     []  Iontophoresis     []  Traction     []  Massage       []  Eval     []  Gait     [x]  Ther Exercise  25 2    [x]  Manual Therapy 15  1    []  Ther Activities       []  Aquatics     []  Neuro Re-Ed       []  Other       Total Treatment Time: 36  3       [address], PT Electronically signed by [address], PT,CHT on 3/5/2020 at 4:39 PM

## 2020-05-08 ENCOUNTER — HOSPITAL ENCOUNTER (OUTPATIENT)
Dept: PHYSICAL THERAPY | Age: 55
Setting detail: THERAPIES SERIES
Discharge: HOME | End: 2020-05-08
Payer: MEDICARE

## 2020-05-08 PROCEDURE — 97140 MANUAL THERAPY 1/> REGIONS: CPT

## 2020-05-08 PROCEDURE — 97110 THERAPEUTIC EXERCISES: CPT

## 2020-05-08 ASSESSMENT — 9 HOLE PEG TEST
TESTTIME_SECONDS: 21
TESTTIME_SECONDS: 24

## 2020-05-08 ASSESSMENT — PAIN DESCRIPTION - ORIENTATION: ORIENTATION: RIGHT

## 2020-05-08 ASSESSMENT — PAIN DESCRIPTION - PROGRESSION: CLINICAL_PROGRESSION: GRADUALLY IMPROVING

## 2020-05-08 ASSESSMENT — PAIN SCALES - GENERAL: PAINLEVEL_OUTOF10: 3

## 2020-05-08 ASSESSMENT — PAIN DESCRIPTION - PAIN TYPE: TYPE: SURGICAL PAIN

## 2020-05-08 ASSESSMENT — PAIN DESCRIPTION - LOCATION: LOCATION: HAND

## 2020-05-08 NOTE — PROGRESS NOTES
hobbies, recreational, or sporting activities 2   Lifting a bag of groceries to waist level 2   Lifting a bag of groceries above your head 1   Grooming your hair 4   Pushing up on your hands (eg from bathtub/chair) 1   Preparing food (eg peeling, cutting) 3   Driving 4   Vacuuming, sweeping, or raking 3   Dressing 4   Doing up buttons 1   Using tools or appliances 4   Opening doors 2   Cleaning 4   Tying or lacing shoes 4   Sleeping 3   Laundering clothes (eg washing, ironing, folding)  4   Opening a jar 1   Throwing a ball 1   Carrying a small suitcase with your affected limb 3   UEFS Score 67.5   UEFS Disability Index 1-19%   UEFS CMS Modifier CI     Upper Extremity Functional Scale -   Instruction to patient  - We are interested in knowing whether you are having any difficulty at all with the activities listed below because of your upper limb problem for which you are currently seeking attention. Key:  0 = Extreme Difficulty or Unable to Perform Activity   1 = Quite a Bit of Difficulty   2 = Moderate difficulty   3 = A Little Bit of Difficulty   4 = No Difficulty   Goals:  Short term goals  Time Frame for Short term goals: 6 visits  Short term goal 1: Decrease pain to 3-4/10 to increase tolerance to daily use of RUE and improve sleep - PARTLY MET  Short term goal 2: Decrease edema on R wrist/ thumb to decrease feeling of tightness and improve mobility - MET  Short term goal 3: Increase ROM of restricted joints by 10 degrees or better to be able to grasp/ handle objects better -  MET  Short term goal 4: Improve coordination by 10 sec to be able to tie shoelaces, do buttons - PARTLY MET ( improved time with 9 hole peg test but still difficult to button shirt  )  Short term goal 5:  Independent with HEP, joint/ nerve protection - MET  Long term goals  Time Frame for Long term goals : 12 visits  Long term goal 1: Improve strength of R elbow/ wrist/ hand to 4-4+/5 for safe lifting/ carrying - PARTLY MET  Long term

## 2020-05-20 ENCOUNTER — HOSPITAL ENCOUNTER (OUTPATIENT)
Dept: PHYSICAL THERAPY | Age: 55
Setting detail: THERAPIES SERIES
Discharge: HOME | End: 2020-05-20
Payer: MEDICARE

## 2020-05-20 PROCEDURE — 97140 MANUAL THERAPY 1/> REGIONS: CPT

## 2020-05-20 PROCEDURE — 97110 THERAPEUTIC EXERCISES: CPT

## 2020-05-20 ASSESSMENT — PAIN SCALES - GENERAL: PAINLEVEL_OUTOF10: 4

## 2020-05-20 ASSESSMENT — PAIN DESCRIPTION - PROGRESSION: CLINICAL_PROGRESSION: NOT CHANGED

## 2020-05-20 ASSESSMENT — PAIN DESCRIPTION - PAIN TYPE: TYPE: SURGICAL PAIN

## 2020-05-20 ASSESSMENT — PAIN DESCRIPTION - ORIENTATION: ORIENTATION: RIGHT

## 2020-05-20 ASSESSMENT — PAIN DESCRIPTION - LOCATION: LOCATION: HAND

## 2020-05-20 NOTE — PROGRESS NOTES
full fist and grasping. Emphasized on thumb stabilization exercises. Treatment Diagnosis: Pain M25.521, M25.531 , M79.641 Edema M25.431  Stiffness M25.631, M25.641  Decreased coordination R27.8    Numbness R20.2   Weakness M62.541, M62.531  Prognosis: Good  REQUIRES PT FOLLOW UP: Yes      Goals:  Patient Goals:  Patient goals : \" Be able to use R arm/ hand as normal as possible, without pain \"  Short Term Goals:  Time Frame for Short term goals: 6 visits  Short term goal 1: Decrease pain to 3-4/10 to increase tolerance to daily use of RUE and improve sleep - PARTLY MET  Short term goal 2: Decrease edema on R wrist/ thumb to decrease feeling of tightness and improve mobility - MET  Short term goal 3: Increase ROM of restricted joints by 10 degrees or better to be able to grasp/ handle objects better -  MET  Short term goal 4: Improve coordination by 10 sec to be able to tie shoelaces, do buttons - PARTLY MET ( improved time with 9 hole peg test but still difficult to button shirt  )  Short term goal 5: Independent with HEP, joint/ nerve protection - MET  Long Term Goals:  Time Frame for Long term goals : 12 visits  Long term goal 1: Improve strength of R elbow/ wrist/ hand to 4-4+/5 for safe lifting/ carrying - PARTLY MET  Long term goal 2: Improve  by 5-10# to be able to grasp, open jars/ bottles - PARTLY MET  Long term goal 3: Improve prehension by 2# each to be able to open packages, tie shoelaces - PARTLY MET  Long term goal 4: Improve score in UEFS by 20 points from 13.75 = 82% disability - MET ( NOW 67.5 = 16% DISABILITY )- REVISE: 0-10% DISABILITY    Plan:     Times per week: Script: 2-3x/wk for 6 weeks, prefers 2x/wk for 12 visits  Current Treatment Recommendations: Strengthening;Patient/Caregiver Education & Training;Manual Therapy - Joint Manipulation;ROM;Modalities; Home Exercise Program;Manual Therapy - Soft Tissue Mobilization  Plan Comment: Monitor symptoms, progress as tolerated.     Therapy Time:  Time In: 0900  Time Out: 0935  Minutes: 35  Timed Code Treatment Minutes: 35 Minutes    Treatment Charges: Minutes Units   []  Ultrasound     []  Electrical-Stim     []  Iontophoresis     []  Traction     []  Massage       []  Eval     []  Gait     [x]  Ther Exercise 20  1    [x]  Manual Therapy 15  1    []  Ther Activities       []  Aquatics     []  Neuro Re-Ed       []  Other       Total Treatment Time: 28 2        Ma  Evetet Levy, PT Electronically signed by Alondra Jean PT,CHT on 5/20/2020 at 9:50 AM

## 2023-09-08 ENCOUNTER — TELEPHONE (OUTPATIENT)
Age: 58
End: 2023-09-08

## 2023-09-08 NOTE — TELEPHONE ENCOUNTER
Patient called to discuss left hand pain. Patient had Left burtons arthroplasty 2/27/23 with DR Leanna Cardenas. Patient is unable to come to this office due to insurance change. Patient reports that she is having pain with tasks such as writing. Patient talked with about josue possibilities but would more than likely need XR to see what is going on. Patient given DR Parvin Rodriguez and DR Alas's number to see if they take her insurance. Patient appreciative and voices understanding.

## 2024-06-30 ENCOUNTER — APPOINTMENT (OUTPATIENT)
Dept: CT IMAGING | Age: 59
End: 2024-06-30
Payer: MEDICAID

## 2024-06-30 ENCOUNTER — HOSPITAL ENCOUNTER (OUTPATIENT)
Age: 59
Setting detail: OBSERVATION
Discharge: HOME | End: 2024-07-01
Attending: EMERGENCY MEDICINE | Admitting: EMERGENCY MEDICINE
Payer: MEDICAID

## 2024-06-30 DIAGNOSIS — R55 SYNCOPE, UNSPECIFIED SYNCOPE TYPE: ICD-10-CM

## 2024-06-30 DIAGNOSIS — R06.02 SHORTNESS OF BREATH: ICD-10-CM

## 2024-06-30 DIAGNOSIS — J44.9 CHRONIC OBSTRUCTIVE PULMONARY DISEASE, UNSPECIFIED COPD TYPE (HCC): ICD-10-CM

## 2024-06-30 DIAGNOSIS — J18.9 COMMUNITY ACQUIRED PNEUMONIA, UNSPECIFIED LATERALITY: ICD-10-CM

## 2024-06-30 DIAGNOSIS — R55 NEAR SYNCOPE: Primary | ICD-10-CM

## 2024-06-30 LAB
ANION GAP SERPL CALCULATED.3IONS-SCNC: 13 MMOL/L (ref 9–16)
BASOPHILS # BLD: 0.03 K/UL (ref 0–0.2)
BASOPHILS NFR BLD: 0 % (ref 0–2)
BNP SERPL-MCNC: 205 PG/ML (ref 0–300)
BUN SERPL-MCNC: 11 MG/DL (ref 6–20)
CA-I BLD-SCNC: 1.11 MMOL/L (ref 1.13–1.33)
CALCIUM SERPL-MCNC: 8.3 MG/DL (ref 8.6–10.4)
CHLORIDE SERPL-SCNC: 103 MMOL/L (ref 98–107)
CO2 SERPL-SCNC: 23 MMOL/L (ref 20–31)
CREAT SERPL-MCNC: 0.6 MG/DL (ref 0.5–0.9)
EOSINOPHIL # BLD: 0.45 K/UL (ref 0–0.44)
EOSINOPHILS RELATIVE PERCENT: 4 % (ref 1–4)
ERYTHROCYTE [DISTWIDTH] IN BLOOD BY AUTOMATED COUNT: 12.9 % (ref 11.8–14.4)
GFR, ESTIMATED: >90 ML/MIN/1.73M2
GLUCOSE SERPL-MCNC: 136 MG/DL (ref 74–99)
HCT VFR BLD AUTO: 43.1 % (ref 36.3–47.1)
HGB BLD-MCNC: 14.2 G/DL (ref 11.9–15.1)
IMM GRANULOCYTES # BLD AUTO: 0.04 K/UL (ref 0–0.3)
IMM GRANULOCYTES NFR BLD: 0 %
LYMPHOCYTES NFR BLD: 2.74 K/UL (ref 1.1–3.7)
LYMPHOCYTES RELATIVE PERCENT: 24 % (ref 24–43)
MAGNESIUM SERPL-MCNC: 1.6 MG/DL (ref 1.6–2.6)
MCH RBC QN AUTO: 30.3 PG (ref 25.2–33.5)
MCHC RBC AUTO-ENTMCNC: 32.9 G/DL (ref 28.4–34.8)
MCV RBC AUTO: 91.9 FL (ref 82.6–102.9)
MONOCYTES NFR BLD: 0.61 K/UL (ref 0.1–1.2)
MONOCYTES NFR BLD: 5 % (ref 3–12)
NEUTROPHILS NFR BLD: 67 % (ref 36–65)
NEUTS SEG NFR BLD: 7.43 K/UL (ref 1.5–8.1)
NRBC BLD-RTO: 0 PER 100 WBC
PLATELET # BLD AUTO: 198 K/UL (ref 138–453)
PMV BLD AUTO: 11.4 FL (ref 8.1–13.5)
POTASSIUM SERPL-SCNC: 3.5 MMOL/L (ref 3.7–5.3)
RBC # BLD AUTO: 4.69 M/UL (ref 3.95–5.11)
SODIUM SERPL-SCNC: 139 MMOL/L (ref 136–145)
T4 FREE SERPL-MCNC: 0.9 NG/DL (ref 0.92–1.68)
TROPONIN I SERPL HS-MCNC: 7 NG/L (ref 0–14)
TROPONIN I SERPL HS-MCNC: 7 NG/L (ref 0–14)
TSH SERPL DL<=0.05 MIU/L-ACNC: 2.16 UIU/ML (ref 0.27–4.2)
WBC OTHER # BLD: 11.3 K/UL (ref 3.5–11.3)

## 2024-06-30 PROCEDURE — G0378 HOSPITAL OBSERVATION PER HR: HCPCS

## 2024-06-30 PROCEDURE — 6370000000 HC RX 637 (ALT 250 FOR IP)

## 2024-06-30 PROCEDURE — 83880 ASSAY OF NATRIURETIC PEPTIDE: CPT

## 2024-06-30 PROCEDURE — 85025 COMPLETE CBC W/AUTO DIFF WBC: CPT

## 2024-06-30 PROCEDURE — 84443 ASSAY THYROID STIM HORMONE: CPT

## 2024-06-30 PROCEDURE — 80048 BASIC METABOLIC PNL TOTAL CA: CPT

## 2024-06-30 PROCEDURE — 6360000004 HC RX CONTRAST MEDICATION

## 2024-06-30 PROCEDURE — 71260 CT THORAX DX C+: CPT

## 2024-06-30 PROCEDURE — 70450 CT HEAD/BRAIN W/O DYE: CPT

## 2024-06-30 PROCEDURE — 99285 EMERGENCY DEPT VISIT HI MDM: CPT

## 2024-06-30 PROCEDURE — 6370000000 HC RX 637 (ALT 250 FOR IP): Performed by: EMERGENCY MEDICINE

## 2024-06-30 PROCEDURE — 84484 ASSAY OF TROPONIN QUANT: CPT

## 2024-06-30 PROCEDURE — 84439 ASSAY OF FREE THYROXINE: CPT

## 2024-06-30 PROCEDURE — 83735 ASSAY OF MAGNESIUM: CPT

## 2024-06-30 PROCEDURE — 93005 ELECTROCARDIOGRAM TRACING: CPT

## 2024-06-30 PROCEDURE — 82330 ASSAY OF CALCIUM: CPT

## 2024-06-30 RX ORDER — FERROUS SULFATE 325(65) MG
325 TABLET ORAL
COMMUNITY

## 2024-06-30 RX ORDER — FERROUS SULFATE 325(65) MG
325 TABLET, DELAYED RELEASE (ENTERIC COATED) ORAL
Status: DISCONTINUED | OUTPATIENT
Start: 2024-07-01 | End: 2024-07-01 | Stop reason: HOSPADM

## 2024-06-30 RX ORDER — ALBUTEROL SULFATE 90 UG/1
1 AEROSOL, METERED RESPIRATORY (INHALATION) EVERY 6 HOURS PRN
Status: DISCONTINUED | OUTPATIENT
Start: 2024-06-30 | End: 2024-07-01 | Stop reason: HOSPADM

## 2024-06-30 RX ORDER — IPRATROPIUM BROMIDE AND ALBUTEROL SULFATE 2.5; .5 MG/3ML; MG/3ML
3 SOLUTION RESPIRATORY (INHALATION) EVERY 6 HOURS PRN
COMMUNITY
Start: 2024-01-04

## 2024-06-30 RX ORDER — HYDROCODONE BITARTRATE AND ACETAMINOPHEN 5; 325 MG/1; MG/1
1 TABLET ORAL EVERY 6 HOURS PRN
Status: DISCONTINUED | OUTPATIENT
Start: 2024-06-30 | End: 2024-06-30

## 2024-06-30 RX ORDER — DOXYCYCLINE HYCLATE 100 MG
100 TABLET ORAL EVERY 12 HOURS SCHEDULED
Status: DISCONTINUED | OUTPATIENT
Start: 2024-06-30 | End: 2024-07-01 | Stop reason: HOSPADM

## 2024-06-30 RX ORDER — ALBUTEROL SULFATE 90 UG/1
AEROSOL, METERED RESPIRATORY (INHALATION)
COMMUNITY
Start: 2023-10-24

## 2024-06-30 RX ORDER — HYDROCODONE BITARTRATE AND ACETAMINOPHEN 5; 325 MG/1; MG/1
2 TABLET ORAL EVERY 6 HOURS PRN
Status: DISCONTINUED | OUTPATIENT
Start: 2024-06-30 | End: 2024-07-01 | Stop reason: HOSPADM

## 2024-06-30 RX ORDER — LORATADINE PSEUDOEPHEDRINE SULFATE 10; 240 MG/1; MG/1
TABLET, EXTENDED RELEASE ORAL
COMMUNITY

## 2024-06-30 RX ADMIN — DOXYCYCLINE HYCLATE 100 MG: 100 TABLET, COATED ORAL at 21:20

## 2024-06-30 RX ADMIN — HYDROCODONE BITARTRATE AND ACETAMINOPHEN 2 TABLET: 5; 325 TABLET ORAL at 16:15

## 2024-06-30 RX ADMIN — IOPAMIDOL 75 ML: 755 INJECTION, SOLUTION INTRAVENOUS at 12:57

## 2024-06-30 RX ADMIN — POTASSIUM BICARBONATE 40 MEQ: 782 TABLET, EFFERVESCENT ORAL at 21:19

## 2024-06-30 ASSESSMENT — PAIN - FUNCTIONAL ASSESSMENT: PAIN_FUNCTIONAL_ASSESSMENT: NONE - DENIES PAIN

## 2024-06-30 ASSESSMENT — PAIN SCALES - GENERAL
PAINLEVEL_OUTOF10: 4
PAINLEVEL_OUTOF10: 8

## 2024-06-30 ASSESSMENT — PAIN DESCRIPTION - ORIENTATION: ORIENTATION: LEFT

## 2024-06-30 ASSESSMENT — PAIN DESCRIPTION - LOCATION: LOCATION: NECK;HIP

## 2024-06-30 ASSESSMENT — PAIN DESCRIPTION - DESCRIPTORS: DESCRIPTORS: SHARP;ACHING;DISCOMFORT

## 2024-06-30 NOTE — ED PROVIDER NOTES
out in a cold sweat. She sat down in the bathroom, but her symptoms did not improve. She then called her  to come and get her. She managed to move from the bathroom to a bench before an ambulance was called.     The patient denies any similar episodes in the past, except for an incident 2 years ago which was attributed to an allergic reaction to steroids. She denies any chest pain associated with the dizziness. No history of cardiac issues, thyroid issues. She had thyroid surgery 15 years ago but is not on any thyroid medication. She has a history of COPD and is a long-term smoker, smoking about half a pack per day for many years. She denies any recreational drug use or heavy alcohol use. She reports a history of poor circulation in her legs, which has been attributed to swelling.    Physical Exam: GCS 15, AO x 4, VSS, PERRL, EOMI, no nystagmus.  NIHSS: 0; no focal deficits and cranial nerves grossly intact.  Well-healed thyroidectomy scar.  HRRR.  Lungs CTA but diminished bilaterally; no rhonchi wheezing or crackles.  Abdomen: S, ND, NTTP.  Palpable radial and DP pulses.  No pitting edema bilateral lower extremities but there is extensive varicose veins throughout patient's lower extremities.    EKG shows normal sinus rhythm rightward axis.  QTc 480 ms.     Concern for: ACS/NSTEMI/STEMI, pulmonary embolism, hyper/hypothyroidism, electrolyte derangement, fluid overload, intracranial bleed, intracranial mass, dehydration    Plan/Impression: Given patient's history and physical exam findings will obtain lab work and imaging for the above concerns.  Decision made to scan patient for head and PE given EKG findings and story.  Dispo pending workup.     [AS]   1303 WBC: 11.3  CBC shows no leukocytosis.  Hemoglobin within reference range.  No indication of vitamin deficiency based on MCV.  Mild elevation of neutrophils/leftward shift [AS]   1333 Calcium, Ionized(!): 1.11  Borderline low ionized calcium. [AS]   1334  Resident    (Please note that portions of this note were completed with a voice recognition program.  Efforts were made to edit the dictations but occasionally words are mis-transcribed.)

## 2024-06-30 NOTE — ED NOTES
ED to inpatient nurses report    Chief Complaint   Patient presents with    Dizziness    Shortness of Breath      Present to ED from home feeling light headed and dizzy after donating plasma  LOC: alert and orientated to name, place, date  Vital signs   Vitals:    06/30/24 1217 06/30/24 1221 06/30/24 1243 06/30/24 1314   BP: 115/85  107/68 119/64   Pulse: 72  65 55   Resp: 10  10 10   Temp:  97.8 °F (36.6 °C)     TempSrc:  Oral     SpO2:   95% 99%   Weight:  79.8 kg (176 lb)     Height:  1.829 m (6')        Oxygen Baseline room air    Current needs required room air   LDAs:   Peripheral IV 06/30/24 Right Forearm (Active)   Site Assessment Clean, dry & intact 06/30/24 1222   Line Status Blood return noted 06/30/24 1222     Mobility: Independent  Pending ED orders: none.   Present condition: stable, alert, oriented.   Code Status: full code  Consults:  []  Hospitalist  Completed  [] yes [] no  []  Medicine  Completed  [] yes [] No  []  Cardiology  Completed  [] yes [] No  []  GI   Completed  [] yes [] No  []  Neurology  Completed  [] yes [] No  []  Nephrology Completed  [] yes [] No  []  Vascular  Completed  [] yes [] No   []  Surgery  Completed  [] yes [] No   []  Urology  Completed  [] yes [] No   []  Plastics  Completed  [] yes [] No   []  ENT  Completed  [] yes [] No   []  Other   Completed  [] yes [] No  Pertinent event(s)   Pertinent event(s)   Electronically signed by PETAR OSMAN RN on 6/30/2024 at 2:19 PM

## 2024-06-30 NOTE — ED PROVIDER NOTES
[facility]     Emergency Department     Faculty Attestation    I performed a history and physical examination of the patient and discussed management with the resident. I reviewed the resident’s note and agree with the documented findings and plan of care. Any areas of disagreement are noted on the chart. I was personally present for the key portions of any procedures. I have documented in the chart those procedures where I was not present during the key portions. I have reviewed the emergency nurses triage note. I agree with the chief complaint, past medical history, past surgical history, allergies, medications, social and family history as documented unless otherwise noted below.        For Physician Assistant/ Nurse Practitioner cases/documentation I have personally evaluated this patient and have completed at least one if not all key elements of the E/M (history, physical exam, and MDM). Additional findings are as noted.  I have personally seen and evaluated the patient.  I find the patient's history and physical exam are consistent with the NP/PA documentation.  I agree with the care provided, treatment rendered, disposition and follow-up plan.    Patient describes an episode of near syncope today did develop associated dyspnea with the episode as well as diaphoresis now she feels slightly lightheaded.  No other complaints this time neurologically intact at the present time nystagmus is noted on extraocular movements otherwise resting comfortably        EKG Interpretation    Interpreted by emergency department physician    Rhythm: normal sinus   Rate: normal  Axis: Rightward  Conduction: normal  ST Segments: no acute change  T Waves: no acute change  Q Waves: no acute change    Clinical Impression:  nonspecific EKG.          Critical Care     Brian Reyes M.D.  Attending Emergency  Physician            Brian Reyes MD  06/30/24 [id number]

## 2024-06-30 NOTE — H&P
[facility]  CDU / OBSERVATION ENCOUNTER  Physician NOTE     Pt Name: Ashlie Nur  MRN: [id number]  Birthdate 1965  Date of evaluation: 6/30/24  Patient's PCP is :  Gaby Walter APRN - CNP    CHIEF COMPLAINT       Chief Complaint   Patient presents with    Dizziness    Shortness of Breath         HISTORY OF PRESENT ILLNESS    Ashlie Nur is a 58 y.o. female who presents after an episode of near syncope.  Patient had dyspnea associated with episode as well as diaphoresis.  Patient still feels slightly lightheaded on presentation to the emergency department.  Patient is neurologically intact and was noted in the emergency department to have some nystagmus.  Patient had no chest pain or shortness of breath.  Patient had nonspecific EKG and workup was otherwise negative for acute pathology.  Patient admitted from the emergency department with plans for supportive therapy and reevaluation    Patient with lightheadedness and near syncope.  Patient did donate blood today.  I think this may have had an effect on what happened earlier.    Location/Symptom: Lightheadedness and near syncope  Timing/Onset: Just prior to presentation  Provocation: Unclear  Quality: Possibly vertiginous.  Patient with lightheadedness and near syncopal event associated with diaphoresis and nausea  Radiation: None  Severity: Moderate to severe initially now minimal  Timing/Duration: Hours.  Improving now.  Modifying Factors: Unclear    History was obtained in part through review of the ED chart. When possible, a direct discussion was had with ED nurses, residents, and attendings  REVIEW OF SYSTEMS        General ROS - No fevers, No malaise   Ophthalmic ROS - No discharge, No changes in vision  ENT ROS -  No sore throat, No rhinorrhea,   Respiratory ROS - no shortness of breath, no cough, no  wheezing  Cardiovascular ROS - No chest pain, no dyspnea on exertion  Gastrointestinal ROS - No abdominal pain, no nausea or vomiting,

## 2024-06-30 NOTE — CARE COORDINATION
DISCHARGE PLANNING EVALUATION: OP/OBSERVATION        6/30/24, 4:42 PM EDT    Ashlie Nur         Location: OBS 23/23-1   Reason for hospitalization: Shortness of breath [R06.02]  Near syncope [R55]  Chronic obstructive pulmonary disease, unspecified COPD type (HCC) [J44.9]  Community acquired pneumonia, unspecified laterality [J18.9]         PCP: Gaby Walter, APRN - CNP    Transportation/Food Security/Housekeeping Addressed:  No issues identified.       Case Management Services Information Letter Provided [x]    Transition plan: home

## 2024-06-30 NOTE — ED NOTES
Pt arrives to ed via [facility].   Pt has new onset COPD, smoke's less than a pack a day.   Pt states she donated plasma today, ate and was well hydrated prior to donating. She sates initially she felt dizzy and sob. Pt states those symptoms have subsided and she just feels a bit light headed.   Pt denies chest pain. Pt states she took all of her medications today.   Pt is alert, oriented, speaking in full, complete sentences, no distress noted.

## 2024-06-30 NOTE — ED NOTES
Pt provided additional warm blankets.   Pt and family updated on plan of care.   Pt denies any current needs.

## 2024-07-01 VITALS
HEIGHT: 72 IN | WEIGHT: 181 LBS | HEART RATE: 60 BPM | RESPIRATION RATE: 17 BRPM | OXYGEN SATURATION: 99 % | SYSTOLIC BLOOD PRESSURE: 110 MMHG | TEMPERATURE: 98.4 F | DIASTOLIC BLOOD PRESSURE: 67 MMHG | BODY MASS INDEX: 24.52 KG/M2

## 2024-07-01 PROBLEM — J44.9 CHRONIC OBSTRUCTIVE PULMONARY DISEASE (HCC): Status: ACTIVE | Noted: 2024-07-01

## 2024-07-01 PROBLEM — R06.02 SHORTNESS OF BREATH: Status: ACTIVE | Noted: 2024-07-01

## 2024-07-01 PROBLEM — J18.9 COMMUNITY ACQUIRED PNEUMONIA: Status: ACTIVE | Noted: 2024-07-01

## 2024-07-01 LAB
EKG ATRIAL RATE: 56 BPM
EKG P AXIS: 67 DEGREES
EKG P-R INTERVAL: 154 MS
EKG Q-T INTERVAL: 426 MS
EKG QRS DURATION: 80 MS
EKG QTC CALCULATION (BAZETT): 411 MS
EKG R AXIS: 73 DEGREES
EKG T AXIS: 61 DEGREES
EKG VENTRICULAR RATE: 56 BPM

## 2024-07-01 PROCEDURE — 6370000000 HC RX 637 (ALT 250 FOR IP)

## 2024-07-01 PROCEDURE — 99223 1ST HOSP IP/OBS HIGH 75: CPT | Performed by: INTERNAL MEDICINE

## 2024-07-01 PROCEDURE — G0378 HOSPITAL OBSERVATION PER HR: HCPCS

## 2024-07-01 PROCEDURE — 6370000000 HC RX 637 (ALT 250 FOR IP): Performed by: EMERGENCY MEDICINE

## 2024-07-01 PROCEDURE — 93005 ELECTROCARDIOGRAM TRACING: CPT | Performed by: EMERGENCY MEDICINE

## 2024-07-01 RX ORDER — DOXYCYCLINE HYCLATE 100 MG
100 TABLET ORAL EVERY 12 HOURS SCHEDULED
Qty: 8 TABLET | Refills: 0 | Status: SHIPPED | OUTPATIENT
Start: 2024-07-01 | End: 2024-07-05

## 2024-07-01 RX ADMIN — DOXYCYCLINE HYCLATE 100 MG: 100 TABLET, COATED ORAL at 07:21

## 2024-07-01 RX ADMIN — HYDROCODONE BITARTRATE AND ACETAMINOPHEN 2 TABLET: 5; 325 TABLET ORAL at 00:32

## 2024-07-01 RX ADMIN — HYDROCODONE BITARTRATE AND ACETAMINOPHEN 2 TABLET: 5; 325 TABLET ORAL at 07:21

## 2024-07-01 RX ADMIN — FERROUS SULFATE TAB EC 325 MG (65 MG FE EQUIVALENT) 325 MG: 325 (65 FE) TABLET DELAYED RESPONSE at 07:21

## 2024-07-01 ASSESSMENT — PAIN DESCRIPTION - DESCRIPTORS: DESCRIPTORS: THROBBING

## 2024-07-01 ASSESSMENT — PAIN SCALES - WONG BAKER
WONGBAKER_NUMERICALRESPONSE: HURTS LITTLE MORE
WONGBAKER_NUMERICALRESPONSE: HURTS EVEN MORE
WONGBAKER_NUMERICALRESPONSE: NO HURT

## 2024-07-01 ASSESSMENT — PAIN SCALES - GENERAL
PAINLEVEL_OUTOF10: 5
PAINLEVEL_OUTOF10: 7
PAINLEVEL_OUTOF10: 8
PAINLEVEL_OUTOF10: 9

## 2024-07-01 ASSESSMENT — PAIN DESCRIPTION - LOCATION
LOCATION: BACK;NECK;HIP
LOCATION: BACK;NECK

## 2024-07-01 ASSESSMENT — PAIN DESCRIPTION - ORIENTATION: ORIENTATION: LEFT

## 2024-07-01 NOTE — CONSULTS
[facility]    Consult               Today's Date: 7/1/2024  Patient Name: Ashlie Nur  Date of admission: 6/30/2024 12:12 PM  Patient's age: 58 y.o., 1965  Admission Dx: Shortness of breath [R06.02]  Near syncope [R55]  Chronic obstructive pulmonary disease, unspecified COPD type (HCC) [J44.9]  Community acquired pneumonia, unspecified laterality [J18.9]    Requesting Physician: Brian Rm MD    Cardiac Evaluation Reason:  Near syncopal episode    History Obtained From: patient and chart review     History of Present Illness:    The patient is a 59 y/o female with a PMHx of COPD, cigarette use, s/p thyroidectomy not on thyroid medication, and chronic leg swelling presenting for near syncopal symptoms that started yesterday. The patient donated plasma yesterday morning then, approximately 4-5 hours later, began experiencing dizziness, diaphoresis, and bilateral hand numbness while standing at the service counter at [facility]. This lasted approximately 30 minutes. No associated chest pain, palpitations, or shortness of breath. She has experienced dizziness in the past after donating blood/plasma but reports the hand numbness and duration of symptoms are new for her.     Past Medical History:   has a past medical history of Chronic back pain, Headache(784.0), and Osteoarthritis.    Past Surgical History:   has a past surgical history that includes [place] tooth extraction; Cholecystectomy; Appendectomy; Thyroid surgery; tumor removal; cyst removal; and Dilation and curettage of uterus.     Home Medications:    Prior to Admission medications    Medication Sig Start Date End Date Taking? Authorizing Provider   doxycycline hyclate (VIBRA-TABS) 100 MG tablet Take 1 tablet by mouth every 12 hours for 8 doses 7/1/24 7/5/24 Yes Brian Rm MD   vitamin D-3 (CHOLECALCIFEROL) 125 MCG (5000 UT) TABS Take 50,000 Int'l Units by mouth 1/2/20  Yes Provider, MD Juanita   ipratropium 0.5 mg-albuterol

## 2024-07-01 NOTE — PROGRESS NOTES
OBS/CDU   RESIDENT NOTE      Patients PCP is:  Gaby Walter, APRN - CNP        SUBJECTIVE      No acute events overnight. Has been able to tolerate a full diet without nausea or vomiting. The patient is urinating on his own and is passing flatus. Denies fever, chills, nausea, vomiting, chest pain, shortness of breath, abdominal pain, focal weakness, numbness, tingling, urinary/bowel symptoms, vision changes, visual hallucinations, or headache.       PHYSICAL EXAM      General: NAD, AO X 3  Heent: EMOI, PERRL  Neck: SUPPLE, NO JVD  Cardiovascular: RRR, S1S2  Pulmonary: CTAB, NO SOB  Abdomen: SOFT, NTTP, ND, +BS  Extremities: +2/4 PULSES DISTAL, NO SWELLING  Neuro / Psych: NO NUMBNESS OR TINGLING, MENTATION AT BASELINE    PERTINENT TEST /EXAMS      I have reviewed all available laboratory results.    MEDICATIONS CURRENT   albuterol sulfate HFA (PROVENTIL;VENTOLIN;PROAIR) 108 (90 Base) MCG/ACT inhaler 1 puff, Q6H PRN  ferrous sulfate (FE TABS 325) EC tablet 325 mg, Daily with breakfast  doxycycline hyclate (VIBRA-TABS) tablet 100 mg, 2 times per day  HYDROcodone-acetaminophen (NORCO) 5-325 MG per tablet 2 tablet, Q6H PRN        All medication charted and reviewed.    CONSULTS      IP CONSULT TO CARDIOLOGY    ASSESSMENT/PLAN        Ashlie Nur is a 58 y.o. female who presents with        Near syncopal episode.  Patient with some components consistent with possible vertigo.  Admitted for symptom relief after negative workup in the emergency department  Patient donated blood earlier in the day.  This may have had a significant influence on what happened later in the day  For reevaluation with cardiology.   No further recommendations  Follow-up outpatient, outpatient echo  Has primary care appointment tomorrow.    Continue home medications and pain control  Monitor vitals, labs, and imaging  DISPO: Medically stable for discharge today.  Amenable to discharge at this time.    --  CHERIE Betancourt MD   Emergency Medicine  Combative behavior

## 2024-07-01 NOTE — PLAN OF CARE
Problem: Discharge Planning  Goal: Discharge to home or other facility with appropriate resources  7/1/2024 1113 by Mely Nash, RN  Outcome: Completed  7/1/2024 1102 by Mely Nash, RN  Outcome: Progressing     Problem: Pain  Goal: Verbalizes/displays adequate comfort level or baseline comfort level  7/1/2024 1113 by Mely Nash, RN  Outcome: Completed  7/1/2024 1102 by Mely Nash, RN  Outcome: Progressing

## 2024-07-01 NOTE — DISCHARGE SUMMARY
CDU Discharge Summary        Patient:  Ashlie Nur  YOB: 1965    MRN: [id number]   Acct: [id number]    Primary Care Physician: Gaby Walter APRN - CNP    Admit date:  6/30/2024 12:12 PM  Discharge date: 7/1/2024 11:14 AM     Discharge Diagnoses:     Acute syncopal episode/vertigo due to possible blood donation  Improved with evaluation by cardiology, setup outpaitent echo    Follow-up:  Call today/tomorrow for a follow up appointment with Gaby Walter APRN - CNP , or return to the Emergency Room with worsening symptoms    Stressed to patient the importance of following up with primary care doctor for further workup/management of symptoms.  Pt verbalizes understanding and agrees with plan.    Discharge Medications:  Changes to medications            Medication List        START taking these medications      doxycycline hyclate 100 MG tablet  Commonly known as: VIBRA-TABS  Take 1 tablet by mouth every 12 hours for 8 doses            CONTINUE taking these medications      Claritin-D 24 Hour  MG per extended release tablet  Generic drug: loratadine-pseudoephedrine     ferrous sulfate 325 (65 Fe) MG tablet  Commonly known as: IRON 325     HYDROcodone-acetaminophen 7.5-325 MG per tablet  Commonly known as: NORCO     ipratropium 0.5 mg-albuterol 2.5 mg 0.5-2.5 (3) MG/3ML Soln nebulizer solution  Commonly known as: DUONEB     Ventolin  (90 Base) MCG/ACT inhaler  Generic drug: albuterol sulfate HFA     vitamin D-3 125 MCG (5000 UT) Tabs  Commonly known as: cholecalciferol            STOP taking these medications      naproxen 500 MG tablet  Commonly known as: Naprosyn               Where to Get Your Medications        These medications were sent to [facility] - [place], [place] - [address] - P [phone] - F [phone] [address] [place] [address]      Phone: [phone]   doxycycline hyclate 100 MG tablet         Diet:  No diet orders on file , Advance as tolerated     Activity:

## 2024-07-01 NOTE — DISCHARGE INSTRUCTIONS
You are seen here for a syncopal event, which is passing out, possibly after donating blood.  We evaluated with cardiology and found that you do not need any cardiology intervention at this time, however he will need outpatient follow-up and outpatient echocardiogram.      This is a procedure that uses an ultrasound machine to take a picture using sound of your heart.  This allows us to see if there are any structural defects in the heart.  We will order one for you.  You will need to call and schedule your appointment.    You may return to the emergency department with any new or worsening symptoms.  I have outlined those below.

## 2024-07-01 NOTE — PROGRESS NOTES
[facility]  CDU / OBSERVATION ENCOUNTER  ATTENDING NOTE         I performed a history and physical examination of the patient and discussed management with the resident or midlevel provider. I reviewed the resident or midlevel provider's note and agree with the documented findings and plan of care. Any areas of disagreement are noted on the chart. I was personally present for the key portions of any procedures. I have documented in the chart those procedures where I was not present during the key portions. I have reviewed the nurses notes. I agree with the chief complaint, past medical history, past surgical history, allergies, medications, social and family history as documented unless otherwise noted below.    The Family history, social history, and ROS are effectively unchanged since admission unless noted elsewhere in the chart.     This patient was placed in the observation unit for reevaluation for possible admission to the hospital     Patient had near syncopal episode after blood donation.  Patient sat down and was recovering afterwards.  Patient admitted for cardiology evaluation.  Patient without evidence of cardiac ischemia or injury on initial ED evaluation.    On reevaluation today patient was improved.  Patient was seen by cardiology.  Patient was felt ready to be discharged after cardiac evaluation.  Patient for outpatient follow-up and reassessment.  Patient was comfortable with discharge and discharge plan.       Brian Rm MD  Attending Emergency  Physician

## 2024-07-02 LAB
EKG ATRIAL RATE: 72 BPM
EKG P AXIS: 85 DEGREES
EKG P-R INTERVAL: 148 MS
EKG Q-T INTERVAL: 382 MS
EKG QRS DURATION: 80 MS
EKG QTC CALCULATION (BAZETT): 418 MS
EKG R AXIS: 90 DEGREES
EKG T AXIS: 76 DEGREES
EKG VENTRICULAR RATE: 72 BPM

## 2024-07-09 ENCOUNTER — HOSPITAL ENCOUNTER (OUTPATIENT)
Age: 59
Discharge: HOME | End: 2024-07-11
Payer: MEDICAID

## 2024-07-09 VITALS
BODY MASS INDEX: 24.52 KG/M2 | HEIGHT: 72 IN | HEART RATE: 60 BPM | SYSTOLIC BLOOD PRESSURE: 110 MMHG | WEIGHT: 181 LBS | DIASTOLIC BLOOD PRESSURE: 67 MMHG

## 2024-07-09 DIAGNOSIS — R55 SYNCOPE, UNSPECIFIED SYNCOPE TYPE: ICD-10-CM

## 2024-07-09 PROCEDURE — 93306 TTE W/DOPPLER COMPLETE: CPT

## 2024-07-11 LAB
ECHO AO ROOT DIAM: 3.5 CM
ECHO AO ROOT INDEX: 1.72 CM/M2
ECHO AV AREA PEAK VELOCITY: 3.1 CM2
ECHO AV AREA VTI: 3.1 CM2
ECHO AV AREA/BSA PEAK VELOCITY: 1.5 CM2/M2
ECHO AV AREA/BSA VTI: 1.5 CM2/M2
ECHO AV MEAN GRADIENT: 4 MMHG
ECHO AV MEAN VELOCITY: 0.9 M/S
ECHO AV PEAK GRADIENT: 7 MMHG
ECHO AV PEAK VELOCITY: 1.3 M/S
ECHO AV VELOCITY RATIO: 0.85
ECHO AV VTI: 30.1 CM
ECHO BSA: 2.04 M2
ECHO LA AREA 2C: 14.3 CM2
ECHO LA AREA 4C: 10.6 CM2
ECHO LA DIAMETER INDEX: 1.86 CM/M2
ECHO LA DIAMETER: 3.8 CM
ECHO LA MAJOR AXIS: 4.3 CM
ECHO LA MINOR AXIS: 5.4 CM
ECHO LA TO AORTIC ROOT RATIO: 1.09
ECHO LA VOL MOD A2C: 31 ML (ref 22–52)
ECHO LA VOL MOD A4C: 21 ML (ref 22–52)
ECHO LA VOLUME INDEX MOD A2C: 15 ML/M2 (ref 16–34)
ECHO LA VOLUME INDEX MOD A4C: 10 ML/M2 (ref 16–34)
ECHO LV E' LATERAL VELOCITY: 14 CM/S
ECHO LV E' SEPTAL VELOCITY: 9 CM/S
ECHO LV FRACTIONAL SHORTENING: 28 % (ref 28–44)
ECHO LV INTERNAL DIMENSION DIASTOLE INDEX: 1.96 CM/M2
ECHO LV INTERNAL DIMENSION DIASTOLIC: 4 CM (ref 3.9–5.3)
ECHO LV INTERNAL DIMENSION SYSTOLIC INDEX: 1.42 CM/M2
ECHO LV INTERNAL DIMENSION SYSTOLIC: 2.9 CM
ECHO LV IVSD: 1.1 CM (ref 0.6–0.9)
ECHO LV MASS 2D: 145.6 G (ref 67–162)
ECHO LV MASS INDEX 2D: 71.4 G/M2 (ref 43–95)
ECHO LV POSTERIOR WALL DIASTOLIC: 1.1 CM (ref 0.6–0.9)
ECHO LV RELATIVE WALL THICKNESS RATIO: 0.55
ECHO LVOT AREA: 3.8 CM2
ECHO LVOT AV VTI INDEX: 0.8
ECHO LVOT DIAM: 2.2 CM
ECHO LVOT MEAN GRADIENT: 2 MMHG
ECHO LVOT PEAK GRADIENT: 5 MMHG
ECHO LVOT PEAK VELOCITY: 1.1 M/S
ECHO LVOT STROKE VOLUME INDEX: 45.1 ML/M2
ECHO LVOT SV: 91.9 ML
ECHO LVOT VTI: 24.2 CM
ECHO MV A VELOCITY: 0.81 M/S
ECHO MV AREA VTI: 2.6 CM2
ECHO MV E DECELERATION TIME (DT): 204 MS
ECHO MV E VELOCITY: 0.98 M/S
ECHO MV E/A RATIO: 1.21
ECHO MV E/E' LATERAL: 7
ECHO MV E/E' RATIO (AVERAGED): 8.94
ECHO MV E/E' SEPTAL: 10.89
ECHO MV LVOT VTI INDEX: 1.43
ECHO MV MAX VELOCITY: 1 M/S
ECHO MV MEAN GRADIENT: 2 MMHG
ECHO MV MEAN VELOCITY: 0.7 M/S
ECHO MV PEAK GRADIENT: 4 MMHG
ECHO MV VTI: 34.7 CM
ECHO RA AREA 4C: 10.6 CM2
ECHO RA END SYSTOLIC VOLUME APICAL 4 CHAMBER INDEX BSA: 11 ML/M2
ECHO RA VOLUME: 22 ML
ECHO RV TAPSE: 2.2 CM (ref 1.7–?)
ECHO TV REGURGITANT MAX VELOCITY: 1.82 M/S
ECHO TV REGURGITANT PEAK GRADIENT: 13 MMHG